# Patient Record
Sex: FEMALE | Race: WHITE | URBAN - METROPOLITAN AREA
[De-identification: names, ages, dates, MRNs, and addresses within clinical notes are randomized per-mention and may not be internally consistent; named-entity substitution may affect disease eponyms.]

---

## 2017-09-28 ENCOUNTER — TRANSFERRED RECORDS (OUTPATIENT)
Dept: HEALTH INFORMATION MANAGEMENT | Facility: CLINIC | Age: 27
End: 2017-09-28

## 2018-06-19 ENCOUNTER — TRANSFERRED RECORDS (OUTPATIENT)
Dept: HEALTH INFORMATION MANAGEMENT | Facility: CLINIC | Age: 28
End: 2018-06-19

## 2018-08-28 ENCOUNTER — TELEPHONE (OUTPATIENT)
Dept: OBGYN | Facility: CLINIC | Age: 28
End: 2018-08-28

## 2018-08-28 NOTE — TELEPHONE ENCOUNTER
Patient is calling would like to schedule appt for birth control-nexplanon. Please call to schedule. Thank you.

## 2018-09-14 ENCOUNTER — OFFICE VISIT (OUTPATIENT)
Dept: OBGYN | Facility: CLINIC | Age: 28
End: 2018-09-14
Payer: COMMERCIAL

## 2018-09-14 VITALS
SYSTOLIC BLOOD PRESSURE: 102 MMHG | WEIGHT: 133 LBS | DIASTOLIC BLOOD PRESSURE: 67 MMHG | TEMPERATURE: 98.1 F | HEART RATE: 66 BPM

## 2018-09-14 DIAGNOSIS — Z30.017 NEXPLANON INSERTION: Primary | ICD-10-CM

## 2018-09-14 PROCEDURE — 11981 INSERTION DRUG DLVR IMPLANT: CPT | Performed by: OBSTETRICS & GYNECOLOGY

## 2018-09-14 NOTE — PATIENT INSTRUCTIONS
If you have any questions regarding your visit, Please contact your care team.    [facility]: [phone]      [facility] CLINIC HOURS TELEPHONE NUMBER   Cephas Agbeh, M.D.    Avelina Trejo -     Kyara Owens - KARRIE             Monday-[place]    8:00a.m-4:45 p.m    Tuesday--[place]     8:00a.m-4:45 p.m.    Thursday-[place]    8:00a.m-4:45 p.m.    Friday-[place]    8:00a.m-4:45 p.m    [facility]   [address]   [place], [place] [address] [phone]-Ask for [facility]   Fax [phone]   Znjyffv-[phone]     [facility] Labor and Delivery   [address] [place] [address] [place] [address]   [phone]     [facility]   [address] [facility] [address] [phone]   Ypryygm-[phone]   Urgent Care locations:    [facility]  Monday-Friday   5 pm - 9 pm   Saturday and Sunday    9 am - 5 pm      Monday-Friday    11 pm - 9 pm  Saturday and Sunday   9 am - 5 pm    [phone]     If you need a medication refill, please contact your pharmacy. Please allow 3 business days for your refill to be completed.  As always, Thank you for trusting us with your healthcare needs!

## 2018-09-14 NOTE — PROGRESS NOTES
Soraida Panchal is a 28 year old female  No LMP recorded. Patient has had an injection.   She discussed the Nexplanon per [facility] at [facility] postpartum.Depoprovera was at discharge on 18 pending Nexplanon Insertion.    I reviewed the Nexplanon as a sub-dermal implant effectiveness for 3 years. Written information was provided    /67 (BP Location: Left arm, Patient Position: Chair, Cuff Size: Adult Regular)  Pulse 66  Temp 98.1  F (36.7  C) (Oral)  Wt 133 lb (60.3 kg)  Breastfeeding? No        We reviewed the mechanism of actions, goals and limitations of the use of the medication, as well as the contraindications.  Bleeding patterns were also reviewed with her. She voiced her understanding.  The patient and I reviewed Nexplanon removal, and should she choose to have the Nexplanon removed, she needs to have someone trained for the insertion and removal. Informed consent obtained.    Patient was placed in a supine position. Left arm was flexed at the elbow and externally rotated. Insertion site was noted at the 8 cm above the elbow crease at the inner side of the upper arm overlying the grove between the biceps and the triceps. A second agata was made 4 cm from the first to use as a guide. The area of the insertion site was prepped with Betadine. Lidocaine 1% was used along the planned insertion site. The Nexplanon was removed from its pack. The Nexplanon applicator was held just above the needle at the textured surface area and the transparent protection cap was removed from the needle. The implant could be seen by looking into the tip of the needle. The implant could be seen by looking into the tip of the needle.  The skin was stretched at the insertion site. The needle was inserted with beveled side up just underneath the skin. Tenting was undertaken while the insertion needle to its full length. The purple slider was unlocked. The slider was moved fully back until it  stopped.  The applicator was removed from her arm. The implant was palpated by both myself and the patient. The betadine was removed from her arm. Sterris/bandaid was applied to site, along with pressure dressing and sterile gauze.    The patient was given aftercare instructions, her user card with the lot number. The patient tolerated the procedure well.  No apparent complications.      Lot#:[id number]  Expiration date:01/2021      ICD-10-CM    1. Nexplanon insertion Z30.017 ETONOGESTREL IMPLANT SYSTEM     INSERTION NON-BIODEGRADABLE DRUG DELIVERY IMPLANT     CEPHAS AGBEH, MD.

## 2018-09-14 NOTE — MR AVS SNAPSHOT
After Visit Summary   9/14/2018    Soraida Panchal    MRN: [id number]           Patient Information     Date Of Birth          1990        Visit Information        Provider Department      9/14/2018 3:30 PM Agbeh, Cephas Mawuena, MD [facility]        Today's Diagnoses     Nexplanon insertion    -  1      Care Instructions                                                        If you have any questions regarding your visit, Please contact your care team.    [facility]: [phone]      [facility] CLINIC HOURS TELEPHONE NUMBER   Cephas Agbeh, M.D.    Avelina Trejo -     Kyara - KARRIE Owens - KARRIE             Monday-[place]    8:00a.m-4:45 p.m    Tuesday--[place]     8:00a.m-4:45 p.m.    Thursday-[place]    8:00a.m-4:45 p.m.    Friday-[place]    8:00a.m-4:45 p.m    [facility]   [address] [place] [address]   [phone]-Ask for [facility]   Fax [phone]   Dqdisfe-[phone]     [facility] Labor and Delivery   [address] [place] [address] [place] [address]   [phone]     [facility]   [address] [address]   [phone]   Lwcheyj-[phone]   Urgent Care locations:    [facility]  Monday-Friday   5 pm - 9 pm   Saturday and Sunday    9 am - 5 pm      Monday-Friday    11 pm - 9 pm  Saturday and Sunday   9 am - 5 pm    [phone]     If you need a medication refill, please contact your pharmacy. Please allow 3 business days for your refill to be completed.  As always, Thank you for trusting us with your healthcare needs!              Follow-ups after your visit        Who to contact     If you have questions or need follow up information about today's clinic visit or your schedule please contact [facility] directly at [phone].  Normal or non-critical lab and imaging results will be communicated to you by [facility],  "letter or phone within 4 business days after the clinic has received the results. If you do not hear from us within 7 days, please contact the clinic through [employer] or phone. If you have a critical or abnormal lab result, we will notify you by phone as soon as possible.  Submit refill requests through [employer] or call your pharmacy and they will forward the refill request to us. Please allow 3 business days for your refill to be completed.          Additional Information About Your Visit        [employer] Information     [employer] lets you send messages to your doctor, view your test results, renew your prescriptions, schedule appointments and more. To sign up, go to [email or web address] . Click on \"Log in\" on the left side of the screen, which will take you to the Welcome page. Then click on \"Sign up Now\" on the right side of the page.     You will be asked to enter the access code listed below, as well as some personal information. Please follow the directions to create your username and password.     Your access code is: [email or web address]  Expires: 2018  4:23 PM     Your access code will  in 90 days. If you need help or a new code, please call your [facility] clinic or [phone].        Care EveryWhere ID     This is your Care EveryWhere ID. This could be used by other organizations to access your [facility] medical records  [id number]        Your Vitals Were     Pulse Temperature Breastfeeding?             66 98.1  F (36.7  C) (Oral) No          Blood Pressure from Last 3 Encounters:   18 102/67   14 108/79    Weight from Last 3 Encounters:   18 133 lb (60.3 kg)              We Performed the Following     ETONOGESTREL IMPLANT SYSTEM     INSERTION NON-BIODEGRADABLE DRUG DELIVERY IMPLANT        Primary Care Provider Office Phone # Fax #    [facility] [phone]       [address] [place] [phone]        Equal Access to Services     MADAI ROWAN " AH: Miguel Ángel Zelaya, warosarioda luqadaha, edgardota kadionicio nickyashutoshinocencia, kumar sarkarmarcopaola mohr. So [facility] [phone].    ATENCIÓN: Si habla español, tiene a hernandez disposición servicios gratuitos de asistencia lingüística. Llame al [phone].    We comply with applicable federal civil rights laws and [place] laws. We do not discriminate on the basis of race, color, national origin, age, disability, sex, sexual orientation, or gender identity.            Thank you!     Thank you for choosing [facility]  for your care. Our goal is always to provide you with excellent care. Hearing back from our patients is one way we can continue to improve our services. Please take a few minutes to complete the written survey that you may receive in the mail after your visit with us. Thank you!             Your Updated Medication List - Protect others around you: Learn how to safely use, store and throw away your medicines at www.disposemymeds.org.      Notice  As of 9/14/2018  4:23 PM    You have not been prescribed any medications.

## 2018-12-10 ENCOUNTER — TELEPHONE (OUTPATIENT)
Dept: OBGYN | Facility: CLINIC | Age: 28
End: 2018-12-10

## 2021-03-09 ENCOUNTER — MEDICAL CORRESPONDENCE (OUTPATIENT)
Dept: HEALTH INFORMATION MANAGEMENT | Facility: CLINIC | Age: 31
End: 2021-03-09

## 2021-03-09 ENCOUNTER — TRANSFERRED RECORDS (OUTPATIENT)
Dept: HEALTH INFORMATION MANAGEMENT | Facility: CLINIC | Age: 31
End: 2021-03-09

## 2021-03-10 ENCOUNTER — TRANSCRIBE ORDERS (OUTPATIENT)
Dept: MATERNAL FETAL MEDICINE | Facility: CLINIC | Age: 31
End: 2021-03-10

## 2021-03-10 DIAGNOSIS — O26.90 PREGNANCY RELATED CONDITION: Primary | ICD-10-CM

## 2021-03-12 ENCOUNTER — HOSPITAL ENCOUNTER (OUTPATIENT)
Dept: CARDIOLOGY | Facility: CLINIC | Age: 31
Discharge: HOME | End: 2021-03-12
Admitting: OBSTETRICS & GYNECOLOGY
Payer: COMMERCIAL

## 2021-03-12 ENCOUNTER — OFFICE VISIT (OUTPATIENT)
Dept: CARDIOLOGY | Facility: CLINIC | Age: 31
End: 2021-03-12
Payer: COMMERCIAL

## 2021-03-12 DIAGNOSIS — O35.BXX0 FETAL CARDIAC DISEASE AFFECTING PREGNANCY, SINGLE OR UNSPECIFIED FETUS: Primary | ICD-10-CM

## 2021-03-12 DIAGNOSIS — O26.90 PREGNANCY RELATED CONDITION: ICD-10-CM

## 2021-03-12 PROCEDURE — 93325 DOPPLER ECHO COLOR FLOW MAPG: CPT

## 2021-03-12 PROCEDURE — 93325 DOPPLER ECHO COLOR FLOW MAPG: CPT | Mod: 26 | Performed by: PEDIATRICS

## 2021-03-12 PROCEDURE — 76825 ECHO EXAM OF FETAL HEART: CPT | Mod: 26 | Performed by: PEDIATRICS

## 2021-03-12 PROCEDURE — 99202 OFFICE O/P NEW SF 15 MIN: CPT | Mod: 25 | Performed by: PEDIATRICS

## 2021-03-12 PROCEDURE — 76827 ECHO EXAM OF FETAL HEART: CPT | Mod: 26 | Performed by: PEDIATRICS

## 2021-03-12 NOTE — PROGRESS NOTES
[facility]   Heart Center Fetal Consult Note    Patient:  Soraida Panchal MRN:  [id number]   YOB: 1990 Age:  30 year old   Date of Visit:  3/12/2021 PCP:  [facility]     Dear Dr. Obrien,     I had the pleasure of seeing Soraida Panchal at the [facility] on 3/12/2021 in fetal cardiology consultation for fetal echocardiogram results. She presented today accompanied by herself. As you know, she is a 30 year old  who presented for fetal echocardiogram today because of 2- vessel cord and incomplete heart exam.    I performed and interpreted the fetal echocardiogram today, which demonstrated Normal fetal cardiac anatomy. Normal fetal intracardiac connections. Normal right and left ventricular size and function. No hydrops. Fetal heart rate is regular at 151 bpm.     I reviewed the echo findings today with Soraida Panchal. She is aware that the study was within normal limits with no major cardiac abnormalities. She is aware of the general limitations of fetal echocardiography. No additional fetal echocardiograms are recommended. No  cardiac follow-up is required.     Thank you for allowing me to participate in Soraida's care. Please do not hesitate to contact me with questions or concerns.    This visit was separate from the performance and interpretation of the ultrasound. The majority of the time (>50%) was spent in counseling and coordination of care. I spent approximately 15 minutes in face-to-face time reviewing the above considerations.    Nael Santos M.D.  Pediatric Cardiology  [address], 5th floor, [place] [address]  Phone [phone]  Fax [phone]